# Patient Record
Sex: FEMALE | Race: BLACK OR AFRICAN AMERICAN | NOT HISPANIC OR LATINO | RURAL
[De-identification: names, ages, dates, MRNs, and addresses within clinical notes are randomized per-mention and may not be internally consistent; named-entity substitution may affect disease eponyms.]

---

## 2021-06-09 VITALS
DIASTOLIC BLOOD PRESSURE: 64 MMHG | HEIGHT: 66 IN | WEIGHT: 101 LBS | SYSTOLIC BLOOD PRESSURE: 97 MMHG | BODY MASS INDEX: 16.23 KG/M2 | TEMPERATURE: 98 F | HEART RATE: 65 BPM

## 2021-06-09 RX ORDER — TRIAMCINOLONE ACETONIDE 1 MG/G
OINTMENT TOPICAL 2 TIMES DAILY
COMMUNITY

## 2021-06-09 RX ORDER — HYDROXYZINE HYDROCHLORIDE 10 MG/1
25 TABLET, FILM COATED ORAL 3 TIMES DAILY PRN
COMMUNITY

## 2021-06-10 ENCOUNTER — OFFICE VISIT (OUTPATIENT)
Dept: FAMILY MEDICINE | Facility: CLINIC | Age: 26
End: 2021-06-10
Payer: COMMERCIAL

## 2021-06-10 VITALS
BODY MASS INDEX: 47.09 KG/M2 | HEIGHT: 66 IN | SYSTOLIC BLOOD PRESSURE: 110 MMHG | DIASTOLIC BLOOD PRESSURE: 78 MMHG | WEIGHT: 293 LBS | HEART RATE: 80 BPM | RESPIRATION RATE: 18 BRPM | TEMPERATURE: 96 F

## 2021-06-10 DIAGNOSIS — L01.00 IMPETIGO: Primary | ICD-10-CM

## 2021-06-10 PROCEDURE — 99213 OFFICE O/P EST LOW 20 MIN: CPT | Mod: ,,, | Performed by: NURSE PRACTITIONER

## 2021-06-10 PROCEDURE — 99213 PR OFFICE/OUTPT VISIT, EST, LEVL III, 20-29 MIN: ICD-10-PCS | Mod: ,,, | Performed by: NURSE PRACTITIONER

## 2021-06-10 RX ORDER — MUPIROCIN 20 MG/G
OINTMENT TOPICAL 3 TIMES DAILY
Qty: 1 TUBE | Refills: 1 | Status: SHIPPED | OUTPATIENT
Start: 2021-06-10

## 2021-06-10 RX ORDER — TERBINAFINE HYDROCHLORIDE 250 MG/1
TABLET ORAL
COMMUNITY
Start: 2021-04-16

## 2021-06-10 RX ORDER — AMOXICILLIN AND CLAVULANATE POTASSIUM 250; 125 MG/1; MG/1
1 TABLET, FILM COATED ORAL EVERY 12 HOURS
Qty: 14 TABLET | Refills: 0 | Status: SHIPPED | OUTPATIENT
Start: 2021-06-10

## 2021-06-10 RX ORDER — CLOTRIMAZOLE AND BETAMETHASONE DIPROPIONATE 10; .64 MG/G; MG/G
CREAM TOPICAL
COMMUNITY
Start: 2021-04-16

## 2021-11-19 ENCOUNTER — LAB VISIT (OUTPATIENT)
Dept: PRIMARY CARE CLINIC | Facility: CLINIC | Age: 26
End: 2021-11-19

## 2021-11-19 DIAGNOSIS — Z02.83 ENCOUNTER FOR DRUG SCREENING: Primary | ICD-10-CM

## 2021-11-19 PROCEDURE — 99000 PR SPECIMEN HANDLING,DR OFF->LAB: ICD-10-PCS | Mod: ,,, | Performed by: NURSE PRACTITIONER

## 2021-11-19 PROCEDURE — 99000 SPECIMEN HANDLING OFFICE-LAB: CPT | Mod: ,,, | Performed by: NURSE PRACTITIONER

## 2025-01-28 ENCOUNTER — OFFICE VISIT (OUTPATIENT)
Dept: PRIMARY CARE CLINIC | Facility: CLINIC | Age: 30
End: 2025-01-28
Payer: COMMERCIAL

## 2025-01-28 VITALS
TEMPERATURE: 100 F | HEART RATE: 109 BPM | RESPIRATION RATE: 20 BRPM | WEIGHT: 285 LBS | BODY MASS INDEX: 45.8 KG/M2 | HEIGHT: 66 IN | DIASTOLIC BLOOD PRESSURE: 66 MMHG | SYSTOLIC BLOOD PRESSURE: 102 MMHG | OXYGEN SATURATION: 100 %

## 2025-01-28 DIAGNOSIS — R51.9 NONINTRACTABLE HEADACHE, UNSPECIFIED CHRONICITY PATTERN, UNSPECIFIED HEADACHE TYPE: ICD-10-CM

## 2025-01-28 DIAGNOSIS — J10.1 TYPE A INFLUENZA: Primary | ICD-10-CM

## 2025-01-28 DIAGNOSIS — R09.81 NASAL CONGESTION: ICD-10-CM

## 2025-01-28 DIAGNOSIS — J01.00 ACUTE NON-RECURRENT MAXILLARY SINUSITIS: ICD-10-CM

## 2025-01-28 DIAGNOSIS — R68.83 CHILLS: ICD-10-CM

## 2025-01-28 DIAGNOSIS — R50.9 FEVER, UNSPECIFIED FEVER CAUSE: ICD-10-CM

## 2025-01-28 DIAGNOSIS — R52 BODY ACHES: ICD-10-CM

## 2025-01-28 LAB
CTP QC/QA: YES
CTP QC/QA: YES
POC MOLECULAR INFLUENZA A AGN: POSITIVE
POC MOLECULAR INFLUENZA B AGN: NEGATIVE
SARS-COV-2 RDRP RESP QL NAA+PROBE: NEGATIVE

## 2025-01-28 PROCEDURE — 3008F BODY MASS INDEX DOCD: CPT | Mod: CPTII,,, | Performed by: NURSE PRACTITIONER

## 2025-01-28 PROCEDURE — 87502 INFLUENZA DNA AMP PROBE: CPT | Mod: QW,,, | Performed by: NURSE PRACTITIONER

## 2025-01-28 PROCEDURE — 1160F RVW MEDS BY RX/DR IN RCRD: CPT | Mod: CPTII,,, | Performed by: NURSE PRACTITIONER

## 2025-01-28 PROCEDURE — 3078F DIAST BP <80 MM HG: CPT | Mod: CPTII,,, | Performed by: NURSE PRACTITIONER

## 2025-01-28 PROCEDURE — 87635 SARS-COV-2 COVID-19 AMP PRB: CPT | Mod: QW,,, | Performed by: NURSE PRACTITIONER

## 2025-01-28 PROCEDURE — 3074F SYST BP LT 130 MM HG: CPT | Mod: CPTII,,, | Performed by: NURSE PRACTITIONER

## 2025-01-28 PROCEDURE — 1159F MED LIST DOCD IN RCRD: CPT | Mod: CPTII,,, | Performed by: NURSE PRACTITIONER

## 2025-01-28 PROCEDURE — 99203 OFFICE O/P NEW LOW 30 MIN: CPT | Mod: ,,, | Performed by: NURSE PRACTITIONER

## 2025-01-28 RX ORDER — TIRZEPATIDE 5 MG/.5ML
INJECTION, SOLUTION SUBCUTANEOUS
COMMUNITY
Start: 2025-01-06

## 2025-01-28 RX ORDER — OSELTAMIVIR PHOSPHATE 75 MG/1
75 CAPSULE ORAL 2 TIMES DAILY
Qty: 10 CAPSULE | Refills: 0 | Status: SHIPPED | OUTPATIENT
Start: 2025-01-28 | End: 2025-02-02

## 2025-01-28 RX ORDER — AZITHROMYCIN 250 MG/1
TABLET, FILM COATED ORAL
Qty: 6 TABLET | Refills: 0 | Status: SHIPPED | OUTPATIENT
Start: 2025-01-28 | End: 2025-02-02

## 2025-01-28 RX ORDER — OMEPRAZOLE 20 MG/1
20 CAPSULE, DELAYED RELEASE ORAL
COMMUNITY
Start: 2024-12-23

## 2025-01-28 NOTE — LETTER
January 28, 2025      [facility] [place] [facility]  [address]   [place] [address]  Phone: [phone]  Fax: [phone]       Patient: Hermelindo Velasco   YOB: 1995  Date of Visit: 01/28/2025    To Whom It May Concern:    Anup Velasco  was at [facility] on 01/28/2025. The patient may return to work/school on 02/03/2025 with no restrictions. If you have any questions or concerns, or if I can be of further assistance, please do not hesitate to contact me.    Sincerely,    Gerson Hernandez DNP,FNP-C

## 2025-01-28 NOTE — PATIENT INSTRUCTIONS
Patient Education       Sinusitis in Adults   The Basics   Written by the doctors and editors at [facility]   What is sinusitis? -- Sinusitis is a condition that can cause a stuffy nose, pain in the face, and discharge (mucus) from the nose. The sinuses are hollow areas in the bones of the face (figure 1). They have a thin lining that normally makes a small amount of mucus. When this lining gets irritated or infected, it swells and makes extra mucus. This causes symptoms.  Sinusitis can occur when a person gets sick with a cold. The germs causing the cold can also infect the sinuses. Many times, a person feels like their cold is getting better. But then they get sinusitis and begin to feel sick again.  What are the symptoms of sinusitis? -- Common symptoms of sinusitis include:  Stuffy or blocked nose  Thick white, yellow, or green discharge from the nose  Pain in the teeth  Pain or pressure in the face - This often feels worse when a person bends forward.  People with sinusitis can also have other symptoms that include:  Fever  Cough  Trouble smelling  Ear pressure or fullness  Headache  Bad breath  Feeling tired  Most of the time, symptoms start to improve in 7 to 10 days.  Should I see a doctor or nurse? -- See your doctor or nurse if your symptoms last more than 10 days, or if your symptoms first get better but then get worse.  Rarely, sinusitis can lead to serious problems. See your doctor or nurse right away (do not wait 10 days) if you have:  Fever higher than 102°F (38.9°C)  Sudden and severe pain in the face and head  Trouble seeing or seeing double  Trouble thinking clearly  Swelling or redness around one or both eyes  A stiff neck  Is there anything I can do on my own to feel better? -- Yes. To reduce your symptoms, you can:  Take an over-the-counter pain reliever to reduce the pain  Rinse your nose and sinuses with salt water a few times a day - Ask your doctor or nurse about the best way to do this.  Your  "doctor might also prescribe a steroid nose spray to reduce the swelling in your nose. (These kinds of steroid nose sprays are safe to take, and do not contain the same steroids that some athletes take illegally.)  How is sinusitis treated? -- Most of the time, sinusitis does not need to be treated with antibiotic medicines. This is because most sinusitis is caused by viruses, not bacteria, and antibiotics do not kill viruses. In fact, even sinusitis caused by bacteria will usually get better on its own without antibiotics.   Some people with sinusitis do need treatment with antibiotics. If your symptoms have not improved after 10 days, ask your doctor if you should take antibiotics. Your doctor might recommend that you wait 1 more week to see if your symptoms improve. But if you have symptoms such as a fever or a lot of pain, they might prescribe antibiotics. It is important to follow your doctor's instructions about taking your antibiotics.  What if my symptoms do not get better? -- If your symptoms do not get better, talk with your doctor or nurse. They might order tests to figure out why you still have symptoms. These can include:  CT scan or other imaging tests - Imaging tests create pictures of the inside of the body.  A test to look inside the sinuses - For this test, a doctor puts a thin tube with a camera on the end into the nose and up into the sinuses.  Some people get a lot of sinus infections or have symptoms that last at least 3 months. These people can have a different type of sinusitis called "chronic sinusitis." Chronic sinusitis can be caused by different things. For example, some people have growths inside their nose or sinuses that are called "polyps." Other people have allergies that cause their symptoms.  Chronic sinusitis can be treated in different ways. If you have chronic sinusitis, talk with your doctor about which treatments are right for you.  All topics are updated as new evidence " becomes available and our peer review process is complete.  This topic retrieved from [employer] on: Sep 21, 2021.  Topic 93143 Version 17.0  Release: 29.4.2 - C29.263  © 2021 UpToDate, Inc. and/or its affiliates. All rights reserved.  figure 1: Sinuses of the face     This drawing shows the sinuses of the face, from the side and front views.  Graphic 864405 Version 1.0    Consumer Information Use and Disclaimer   This information is not specific medical advice and does not replace information you receive from your health care provider. This is only a brief summary of general information. It does NOT include all information about conditions, illnesses, injuries, tests, procedures, treatments, therapies, discharge instructions or life-style choices that may apply to you. You must talk with your health care provider for complete information about your health and treatment options. This information should not be used to decide whether or not to accept your health care provider's advice, instructions or recommendations. Only your health care provider has the knowledge and training to provide advice that is right for you. The use of this information is governed by the [employer] End User License Agreement, available at https://www.[employer].Beautylish/en/solutions/[employer]/about/radha.The use of [employer] content is governed by the [employer] Terms of Use. ©2021 UpToDate, Inc. All rights reserved.  Copyright   © 2021 UpToDate, Inc. and/or its affiliates. All rights reserved.

## 2025-01-28 NOTE — PROGRESS NOTES
[facility] [address] [place] [address]  Ph: [phone]   Gerson Hernandez DNP, FNP-C  Primary Care       PATIENT NAME: Hermelindo Velasco   : 1995    AGE: 29 y.o. DATE: 2025    MRN: [id number]        Reason for Visit / Chief Complaint:  Headache (Chills.), Back Pain (Mid lower back.), and Nasal Congestion     Subjective:     HPI: Patient complains of headache, chills, body aches, runny nose, nasal congestion. Symptoms started yesterday.            Review of Systems: Review of Systems   Constitutional:  Positive for chills and fever. Negative for fatigue.   HENT:  Positive for congestion and rhinorrhea. Negative for ear pain and sore throat.    Respiratory:  Negative for cough, chest tightness and shortness of breath.    Cardiovascular:  Negative for chest pain.   Gastrointestinal:  Negative for abdominal pain, constipation, diarrhea, nausea and vomiting.   Genitourinary:  Negative for dysuria.   Musculoskeletal:  Positive for myalgias. Negative for gait problem.   Skin:  Negative for rash.   Neurological:  Positive for headaches.          Review of patient's allergies indicates:  No Known Allergies     Med List:  Current Outpatient Medications on File Prior to Visit   Medication Sig Dispense Refill    MOUNJARO 5 mg/0.5 mL PnIj       omeprazole (PRILOSEC) 20 MG capsule Take 20 mg by mouth.      amoxicillin-clavulanate 250-125mg (AUGMENTIN) 250-125 mg Tab Take 1 tablet by mouth every 12 (twelve) hours. (Patient not taking: Reported on 2025) 14 tablet 0    clotrimazole-betamethasone 1-0.05% (LOTRISONE) cream  (Patient not taking: Reported on 2025)      hydrOXYzine HCL (ATARAX) 10 MG Tab Take 25 mg by mouth 3 (three) times daily as needed. (Patient not taking: Reported on 2025)      mupirocin (BACTROBAN) 2 % ointment Apply topically 3 (three) times daily. (Patient not taking: Reported on 2025) 1 Tube 1    terbinafine HCL (LAMISIL) 250 mg tablet   "(Patient not taking: Reported on 1/28/2025)      triamcinolone acetonide 0.1% (KENALOG) 0.1 % ointment Apply topically 2 (two) times daily. (Patient not taking: Reported on 1/28/2025)       No current facility-administered medications on file prior to visit.       Medical/Social/Family History:  Past Medical History:   Diagnosis Date    Carpal tunnel syndrome     Contact dermatitis     UTI (urinary tract infection)       Social History     Tobacco Use   Smoking Status Never   Smokeless Tobacco Never      Social History     Substance and Sexual Activity   Alcohol Use Not Currently    Comment: only couple times a year        Family History   Problem Relation Name Age of Onset    Hypertension Mother      No Known Problems Father        History reviewed. No pertinent surgical history.     There is no immunization history on file for this patient.       Objective:      Vitals:    01/28/25 1011   BP: 102/66   BP Location: Right arm   Patient Position: Sitting   Pulse: 109   Resp: 20   Temp: 100.1 °F (37.8 °C)   TempSrc: Oral   SpO2: 100%   Weight: 129.3 kg (285 lb)   Height: 5' 6" (1.676 m)     Body mass index is 46 kg/m².     Physical Exam: Physical Exam  Vitals and nursing note reviewed.   Constitutional:       General: She is not in acute distress.     Appearance: Normal appearance. She is obese. She is not ill-appearing, toxic-appearing or diaphoretic.   HENT:      Head: Normocephalic.      Right Ear: Tympanic membrane, ear canal and external ear normal.      Left Ear: Tympanic membrane, ear canal and external ear normal.      Nose:      Right Sinus: Maxillary sinus tenderness present.      Left Sinus: Maxillary sinus tenderness present.      Mouth/Throat:      Mouth: Mucous membranes are moist.   Eyes:      Extraocular Movements: Extraocular movements intact.      Conjunctiva/sclera: Conjunctivae normal.      Pupils: Pupils are equal, round, and reactive to light.   Cardiovascular:      Rate and Rhythm: Regular " rhythm. Tachycardia present.      Heart sounds: Normal heart sounds.   Pulmonary:      Effort: Pulmonary effort is normal.      Breath sounds: Normal breath sounds.   Abdominal:      Palpations: Abdomen is soft.   Musculoskeletal:         General: Normal range of motion.      Cervical back: Normal range of motion and neck supple.   Skin:     General: Skin is warm and dry.   Neurological:      General: No focal deficit present.      Mental Status: She is alert and oriented to person, place, and time.      Gait: Gait normal.   Psychiatric:         Mood and Affect: Mood normal.         Behavior: Behavior normal.                Assessment:          ICD-10-CM ICD-9-CM   1. Type A influenza  J10.1 487.1   2. Fever, unspecified fever cause  R50.9 780.60   3. Nonintractable headache, unspecified chronicity pattern, unspecified headache type  R51.9 784.0   4. Chills  R68.83 780.64   5. Nasal congestion  R09.81 478.19   6. Body aches  R52 780.96   7. Acute non-recurrent maxillary sinusitis  J01.00 461.0        Plan:       Type A influenza  -     oseltamivir (TAMIFLU) 75 MG capsule; Take 1 capsule (75 mg total) by mouth 2 (two) times daily. for 5 days  Dispense: 10 capsule; Refill: 0    Acute non-recurrent maxillary sinusitis  -     azithromycin (Z-JEMAL) 250 MG tablet; Take 2 tablets by mouth on day 1; Take 1 tablet by mouth on days 2-5  Dispense: 6 tablet; Refill: 0      Fever, unspecified fever cause  -     POCT Influenza A/B Molecular  -     POCT COVID-19 Rapid Screening  - Recommend ibuprofen or tylenol for fever    Nonintractable headache, unspecified chronicity pattern, unspecified headache type  -     POCT Influenza A/B Molecular  -     POCT COVID-19 Rapid Screening    Chills  -     POCT Influenza A/B Molecular  -     POCT COVID-19 Rapid Screening    Nasal congestion  -     POCT Influenza A/B Molecular  -     POCT COVID-19 Rapid Screening    Body aches  -     POCT Influenza A/B Molecular  -     POCT COVID-19 Rapid  Screening      Component      Latest Ref Rng 1/28/2025    Acceptable Yes     Acceptable Yes    POC Molecular Influenza A Ag      Negative  Positive !    POC Molecular Influenza B Ag      Negative  Negative       Component      Latest Ref Rng 1/28/2025   SARS-CoV-2 RNA, Amplification, Qual      Negative  Negative     Acceptable Yes     Acceptable Yes          New & refilled meds:  Requested Prescriptions     Signed Prescriptions Disp Refills    oseltamivir (TAMIFLU) 75 MG capsule 10 capsule 0     Sig: Take 1 capsule (75 mg total) by mouth 2 (two) times daily. for 5 days    azithromycin (Z-JEMAL) 250 MG tablet 6 tablet 0     Sig: Take 2 tablets by mouth on day 1; Take 1 tablet by mouth on days 2-5       Follow up if symptoms worsen or fail to improve.     Patient Instructions   Patient Education       Sinusitis in Adults   The Basics   Written by the doctors and editors at [facility]   What is sinusitis? -- Sinusitis is a condition that can cause a stuffy nose, pain in the face, and discharge (mucus) from the nose. The sinuses are hollow areas in the bones of the face (figure 1). They have a thin lining that normally makes a small amount of mucus. When this lining gets irritated or infected, it swells and makes extra mucus. This causes symptoms.  Sinusitis can occur when a person gets sick with a cold. The germs causing the cold can also infect the sinuses. Many times, a person feels like their cold is getting better. But then they get sinusitis and begin to feel sick again.  What are the symptoms of sinusitis? -- Common symptoms of sinusitis include:  Stuffy or blocked nose  Thick white, yellow, or green discharge from the nose  Pain in the teeth  Pain or pressure in the face - This often feels worse when a person bends forward.  People with sinusitis can also have other symptoms that include:  Fever  Cough  Trouble smelling  Ear pressure or  fullness  Headache  Bad breath  Feeling tired  Most of the time, symptoms start to improve in 7 to 10 days.  Should I see a doctor or nurse? -- See your doctor or nurse if your symptoms last more than 10 days, or if your symptoms first get better but then get worse.  Rarely, sinusitis can lead to serious problems. See your doctor or nurse right away (do not wait 10 days) if you have:  Fever higher than 102°F (38.9°C)  Sudden and severe pain in the face and head  Trouble seeing or seeing double  Trouble thinking clearly  Swelling or redness around one or both eyes  A stiff neck  Is there anything I can do on my own to feel better? -- Yes. To reduce your symptoms, you can:  Take an over-the-counter pain reliever to reduce the pain  Rinse your nose and sinuses with salt water a few times a day - Ask your doctor or nurse about the best way to do this.  Your doctor might also prescribe a steroid nose spray to reduce the swelling in your nose. (These kinds of steroid nose sprays are safe to take, and do not contain the same steroids that some athletes take illegally.)  How is sinusitis treated? -- Most of the time, sinusitis does not need to be treated with antibiotic medicines. This is because most sinusitis is caused by viruses, not bacteria, and antibiotics do not kill viruses. In fact, even sinusitis caused by bacteria will usually get better on its own without antibiotics.   Some people with sinusitis do need treatment with antibiotics. If your symptoms have not improved after 10 days, ask your doctor if you should take antibiotics. Your doctor might recommend that you wait 1 more week to see if your symptoms improve. But if you have symptoms such as a fever or a lot of pain, they might prescribe antibiotics. It is important to follow your doctor's instructions about taking your antibiotics.  What if my symptoms do not get better? -- If your symptoms do not get better, talk with your doctor or nurse. They might order  "tests to figure out why you still have symptoms. These can include:  CT scan or other imaging tests - Imaging tests create pictures of the inside of the body.  A test to look inside the sinuses - For this test, a doctor puts a thin tube with a camera on the end into the nose and up into the sinuses.  Some people get a lot of sinus infections or have symptoms that last at least 3 months. These people can have a different type of sinusitis called "chronic sinusitis." Chronic sinusitis can be caused by different things. For example, some people have growths inside their nose or sinuses that are called "polyps." Other people have allergies that cause their symptoms.  Chronic sinusitis can be treated in different ways. If you have chronic sinusitis, talk with your doctor about which treatments are right for you.  All topics are updated as new evidence becomes available and our peer review process is complete.  This topic retrieved from [employer] on: Sep 21, 2021.  Topic 01172 Version 17.0  Release: 29.4.2 - C29.263  © 2021 UpToDate, Inc. and/or its affiliates. All rights reserved.  figure 1: Sinuses of the face     This drawing shows the sinuses of the face, from the side and front views.  Graphic 343889 Version 1.0    Consumer Information Use and Disclaimer   This information is not specific medical advice and does not replace information you receive from your health care provider. This is only a brief summary of general information. It does NOT include all information about conditions, illnesses, injuries, tests, procedures, treatments, therapies, discharge instructions or life-style choices that may apply to you. You must talk with your health care provider for complete information about your health and treatment options. This information should not be used to decide whether or not to accept your health care provider's advice, instructions or recommendations. Only your health care provider has the knowledge and " training to provide advice that is right for you. The use of this information is governed by the [employer] End User License Agreement, available at https://[email or web address].Novel/en/solutions/[employer]/about/radha.The use of [employer] content is governed by the [employer] Terms of Use. ©2021 UpToDate, Inc. All rights reserved.  Copyright   © 2021 UpToDate, Inc. and/or its affiliates. All rights reserved.           Signature: Gerson Hernandez DNP, FNP-C